# Patient Record
Sex: MALE | Race: WHITE | Employment: FULL TIME | URBAN - METROPOLITAN AREA
[De-identification: names, ages, dates, MRNs, and addresses within clinical notes are randomized per-mention and may not be internally consistent; named-entity substitution may affect disease eponyms.]

---

## 2017-09-14 ENCOUNTER — OFFICE VISIT (OUTPATIENT)
Dept: FAMILY MEDICINE CLINIC | Facility: CLINIC | Age: 32
End: 2017-09-14

## 2017-09-14 VITALS
BODY MASS INDEX: 29.1 KG/M2 | HEART RATE: 62 BPM | DIASTOLIC BLOOD PRESSURE: 80 MMHG | SYSTOLIC BLOOD PRESSURE: 118 MMHG | WEIGHT: 201 LBS | TEMPERATURE: 99 F | OXYGEN SATURATION: 97 % | RESPIRATION RATE: 18 BRPM | HEIGHT: 69.5 IN

## 2017-09-14 DIAGNOSIS — S69.91XA FINGER INJURY, RIGHT, INITIAL ENCOUNTER: Primary | ICD-10-CM

## 2017-09-14 PROCEDURE — 99203 OFFICE O/P NEW LOW 30 MIN: CPT | Performed by: EMERGENCY MEDICINE

## 2017-09-14 NOTE — PROGRESS NOTES
Chief Complaint:   Patient presents with: Injury: RT hand 3rd digit, smashed finger 3 months ago. HPI:3   This is a 33year old male       RIGHT FINGER PAIN  Injured right middle finger 4 months ago.  Finger was smashed between 2 sheets of metal, and w developed, well nourished, well hydrated, no distress  SKIN: good skin turgor, no obvious rashes    EXTREMITIES: no cyanosis, clubbing or edema. Right hand without deformity. RMF with trace swelling over PIP joint with limited ROM to flexion at PIP joint.

## 2017-09-14 NOTE — PATIENT INSTRUCTIONS
Thank you for choosing [address] [place] [address] Group  To Do:   [address]  1. Have xrays done  2. Need to follow up with Hand Specialist, Dr. Alexi Tyler  3. Ff up for annual physical  4. [facility] Alexi Tyler M.D.     Hand and Upper Extremity Surgery  Physician pain, unless another pain medicine was prescribed. If you have chronic liver or kidney disease or ever had a stomach ulcer or GI bleeding, talk with your healthcare provider before using these medicines.   Follow-up care  Follow up with your healthcare prov